# Patient Record
Sex: FEMALE | NOT HISPANIC OR LATINO | URBAN - METROPOLITAN AREA
[De-identification: names, ages, dates, MRNs, and addresses within clinical notes are randomized per-mention and may not be internally consistent; named-entity substitution may affect disease eponyms.]

---

## 2017-01-03 ENCOUNTER — AMBULATORY - HEALTHEAST (OUTPATIENT)
Dept: PULMONOLOGY | Facility: OTHER | Age: 62
End: 2017-01-03

## 2017-01-03 DIAGNOSIS — J44.9 COPD (CHRONIC OBSTRUCTIVE PULMONARY DISEASE) (H): ICD-10-CM

## 2017-03-03 ENCOUNTER — RECORDS - HEALTHEAST (OUTPATIENT)
Dept: ADMINISTRATIVE | Facility: OTHER | Age: 62
End: 2017-03-03

## 2017-03-05 ENCOUNTER — COMMUNICATION - HEALTHEAST (OUTPATIENT)
Dept: SCHEDULING | Facility: CLINIC | Age: 62
End: 2017-03-05

## 2017-03-07 ENCOUNTER — HOME CARE/HOSPICE - HEALTHEAST (OUTPATIENT)
Dept: HOME HEALTH SERVICES | Facility: HOME HEALTH | Age: 62
End: 2017-03-07

## 2017-03-13 ENCOUNTER — HOME CARE/HOSPICE - HEALTHEAST (OUTPATIENT)
Dept: HOME HEALTH SERVICES | Facility: HOME HEALTH | Age: 62
End: 2017-03-13

## 2017-03-22 ENCOUNTER — HOME CARE/HOSPICE - HEALTHEAST (OUTPATIENT)
Dept: HOME HEALTH SERVICES | Facility: HOME HEALTH | Age: 62
End: 2017-03-22

## 2017-03-31 ENCOUNTER — HOME CARE/HOSPICE - HEALTHEAST (OUTPATIENT)
Dept: HOME HEALTH SERVICES | Facility: HOME HEALTH | Age: 62
End: 2017-03-31

## 2017-04-02 ENCOUNTER — HOME CARE/HOSPICE - HEALTHEAST (OUTPATIENT)
Dept: HOME HEALTH SERVICES | Facility: HOME HEALTH | Age: 62
End: 2017-04-02

## 2017-04-04 ENCOUNTER — OFFICE VISIT - HEALTHEAST (OUTPATIENT)
Dept: PULMONOLOGY | Facility: OTHER | Age: 62
End: 2017-04-04

## 2017-04-04 DIAGNOSIS — J96.11 CHRONIC RESPIRATORY FAILURE WITH HYPOXIA (H): ICD-10-CM

## 2017-04-06 ENCOUNTER — AMBULATORY - HEALTHEAST (OUTPATIENT)
Dept: PULMONOLOGY | Facility: OTHER | Age: 62
End: 2017-04-06

## 2017-04-07 ENCOUNTER — HOME CARE/HOSPICE - HEALTHEAST (OUTPATIENT)
Dept: HOME HEALTH SERVICES | Facility: HOME HEALTH | Age: 62
End: 2017-04-07

## 2017-04-08 ENCOUNTER — HOME CARE/HOSPICE - HEALTHEAST (OUTPATIENT)
Dept: HOME HEALTH SERVICES | Facility: HOME HEALTH | Age: 62
End: 2017-04-08

## 2017-04-14 ENCOUNTER — HOME CARE/HOSPICE - HEALTHEAST (OUTPATIENT)
Dept: HOME HEALTH SERVICES | Facility: HOME HEALTH | Age: 62
End: 2017-04-14

## 2017-04-21 ENCOUNTER — HOME CARE/HOSPICE - HEALTHEAST (OUTPATIENT)
Dept: HOME HEALTH SERVICES | Facility: HOME HEALTH | Age: 62
End: 2017-04-21

## 2017-04-28 ENCOUNTER — HOME CARE/HOSPICE - HEALTHEAST (OUTPATIENT)
Dept: HOME HEALTH SERVICES | Facility: HOME HEALTH | Age: 62
End: 2017-04-28

## 2017-05-04 ENCOUNTER — HOME CARE/HOSPICE - HEALTHEAST (OUTPATIENT)
Dept: HOME HEALTH SERVICES | Facility: HOME HEALTH | Age: 62
End: 2017-05-04

## 2017-05-05 ENCOUNTER — HOME CARE/HOSPICE - HEALTHEAST (OUTPATIENT)
Dept: HOME HEALTH SERVICES | Facility: HOME HEALTH | Age: 62
End: 2017-05-05

## 2017-05-08 ENCOUNTER — OFFICE VISIT - HEALTHEAST (OUTPATIENT)
Dept: PULMONOLOGY | Facility: OTHER | Age: 62
End: 2017-05-08

## 2017-05-08 DIAGNOSIS — J44.9 COPD (CHRONIC OBSTRUCTIVE PULMONARY DISEASE) (H): ICD-10-CM

## 2017-05-12 ENCOUNTER — HOME CARE/HOSPICE - HEALTHEAST (OUTPATIENT)
Dept: HOME HEALTH SERVICES | Facility: HOME HEALTH | Age: 62
End: 2017-05-12

## 2017-06-13 ENCOUNTER — AMBULATORY - HEALTHEAST (OUTPATIENT)
Dept: PULMONOLOGY | Facility: OTHER | Age: 62
End: 2017-06-13

## 2017-06-13 DIAGNOSIS — J44.9 COPD (CHRONIC OBSTRUCTIVE PULMONARY DISEASE) (H): ICD-10-CM

## 2017-07-27 ENCOUNTER — AMBULATORY - HEALTHEAST (OUTPATIENT)
Dept: PULMONOLOGY | Facility: OTHER | Age: 62
End: 2017-07-27

## 2017-07-27 DIAGNOSIS — J44.9 COPD (CHRONIC OBSTRUCTIVE PULMONARY DISEASE) (H): ICD-10-CM

## 2017-08-08 ENCOUNTER — OFFICE VISIT - HEALTHEAST (OUTPATIENT)
Dept: PULMONOLOGY | Facility: OTHER | Age: 62
End: 2017-08-08

## 2017-08-08 DIAGNOSIS — J44.9 COPD (CHRONIC OBSTRUCTIVE PULMONARY DISEASE) (H): ICD-10-CM

## 2017-09-19 ENCOUNTER — COMMUNICATION - HEALTHEAST (OUTPATIENT)
Dept: PULMONOLOGY | Facility: OTHER | Age: 62
End: 2017-09-19

## 2017-09-19 DIAGNOSIS — J44.9 COPD (CHRONIC OBSTRUCTIVE PULMONARY DISEASE) (H): ICD-10-CM

## 2017-11-08 ENCOUNTER — AMBULATORY - HEALTHEAST (OUTPATIENT)
Dept: PULMONOLOGY | Facility: OTHER | Age: 62
End: 2017-11-08

## 2017-11-08 DIAGNOSIS — J44.9 COPD (CHRONIC OBSTRUCTIVE PULMONARY DISEASE) (H): ICD-10-CM

## 2018-01-01 ENCOUNTER — COMMUNICATION - HEALTHEAST (OUTPATIENT)
Dept: RESPIRATORY THERAPY | Facility: CLINIC | Age: 63
End: 2018-01-01

## 2018-01-01 ENCOUNTER — AMBULATORY - HEALTHEAST (OUTPATIENT)
Dept: PULMONOLOGY | Facility: OTHER | Age: 63
End: 2018-01-01

## 2018-01-01 ENCOUNTER — COMMUNICATION - HEALTHEAST (OUTPATIENT)
Dept: PHARMACY | Facility: CLINIC | Age: 63
End: 2018-01-01

## 2018-01-01 ENCOUNTER — COMMUNICATION - HEALTHEAST (OUTPATIENT)
Dept: PULMONOLOGY | Facility: OTHER | Age: 63
End: 2018-01-01

## 2018-01-01 ENCOUNTER — RECORDS - HEALTHEAST (OUTPATIENT)
Dept: ADMINISTRATIVE | Facility: OTHER | Age: 63
End: 2018-01-01

## 2018-01-01 ENCOUNTER — OFFICE VISIT - HEALTHEAST (OUTPATIENT)
Dept: PULMONOLOGY | Facility: OTHER | Age: 63
End: 2018-01-01

## 2018-01-01 ENCOUNTER — COMMUNICATION - HEALTHEAST (OUTPATIENT)
Dept: SCHEDULING | Facility: CLINIC | Age: 63
End: 2018-01-01

## 2018-01-01 ENCOUNTER — RECORDS - HEALTHEAST (OUTPATIENT)
Dept: LAB | Facility: CLINIC | Age: 63
End: 2018-01-01

## 2018-01-01 DIAGNOSIS — J44.1 COPD WITH EXACERBATION (H): ICD-10-CM

## 2018-01-01 DIAGNOSIS — J44.9 COPD (CHRONIC OBSTRUCTIVE PULMONARY DISEASE) (H): ICD-10-CM

## 2018-01-01 DIAGNOSIS — J44.9 CHRONIC OBSTRUCTIVE PULMONARY DISEASE, UNSPECIFIED COPD TYPE (H): ICD-10-CM

## 2018-01-01 DIAGNOSIS — J43.2 CENTRILOBULAR EMPHYSEMA (H): ICD-10-CM

## 2018-01-01 DIAGNOSIS — Z51.5 ENCOUNTER FOR PALLIATIVE CARE: ICD-10-CM

## 2018-01-01 DIAGNOSIS — R06.02 SOB (SHORTNESS OF BREATH): ICD-10-CM

## 2018-01-01 DIAGNOSIS — Z71.89 ADVANCE CARE PLANNING: ICD-10-CM

## 2018-01-01 DIAGNOSIS — J44.1 COPD EXACERBATION (H): ICD-10-CM

## 2018-01-01 LAB — BACTERIA SPEC CULT: NO GROWTH

## 2018-01-01 RX ORDER — BUDESONIDE AND FORMOTEROL FUMARATE DIHYDRATE 160; 4.5 UG/1; UG/1
1 AEROSOL RESPIRATORY (INHALATION) 2 TIMES DAILY
Qty: 1 INHALER | Refills: 6 | Status: SHIPPED | OUTPATIENT
Start: 2018-01-01 | End: 2019-01-01

## 2018-01-01 ASSESSMENT — MIFFLIN-ST. JEOR: SCORE: 1028.96

## 2018-01-09 ENCOUNTER — RECORDS - HEALTHEAST (OUTPATIENT)
Dept: LAB | Facility: CLINIC | Age: 63
End: 2018-01-09

## 2018-01-09 LAB
ALBUMIN SERPL-MCNC: 3.5 G/DL (ref 3.5–5)
ALP SERPL-CCNC: 57 U/L (ref 45–120)
ALT SERPL W P-5'-P-CCNC: 17 U/L (ref 0–45)
ANION GAP SERPL CALCULATED.3IONS-SCNC: 13 MMOL/L (ref 5–18)
AST SERPL W P-5'-P-CCNC: 12 U/L (ref 0–40)
BILIRUB SERPL-MCNC: 0.3 MG/DL (ref 0–1)
BUN SERPL-MCNC: 5 MG/DL (ref 8–22)
CALCIUM SERPL-MCNC: 9.2 MG/DL (ref 8.5–10.5)
CHLORIDE BLD-SCNC: 100 MMOL/L (ref 98–107)
CO2 SERPL-SCNC: 27 MMOL/L (ref 22–31)
CREAT SERPL-MCNC: 0.51 MG/DL (ref 0.6–1.1)
GFR SERPL CREATININE-BSD FRML MDRD: >60 ML/MIN/1.73M2
GLUCOSE BLD-MCNC: 97 MG/DL (ref 70–125)
POTASSIUM BLD-SCNC: 3.4 MMOL/L (ref 3.5–5)
PROT SERPL-MCNC: 6.7 G/DL (ref 6–8)
SODIUM SERPL-SCNC: 140 MMOL/L (ref 136–145)

## 2018-01-10 LAB — BACTERIA SPEC CULT: NO GROWTH

## 2018-01-17 ENCOUNTER — AMBULATORY - HEALTHEAST (OUTPATIENT)
Dept: PULMONOLOGY | Facility: OTHER | Age: 63
End: 2018-01-17

## 2018-01-17 DIAGNOSIS — J44.9 COPD (CHRONIC OBSTRUCTIVE PULMONARY DISEASE) (H): ICD-10-CM

## 2018-02-05 ENCOUNTER — RECORDS - HEALTHEAST (OUTPATIENT)
Dept: LAB | Facility: CLINIC | Age: 63
End: 2018-02-05

## 2018-02-06 LAB — BACTERIA SPEC CULT: NO GROWTH

## 2018-02-19 ENCOUNTER — AMBULATORY - HEALTHEAST (OUTPATIENT)
Dept: PULMONOLOGY | Facility: OTHER | Age: 63
End: 2018-02-19

## 2018-02-20 ENCOUNTER — AMBULATORY - HEALTHEAST (OUTPATIENT)
Dept: PULMONOLOGY | Facility: OTHER | Age: 63
End: 2018-02-20

## 2018-02-20 DIAGNOSIS — J44.9 COPD (CHRONIC OBSTRUCTIVE PULMONARY DISEASE) (H): ICD-10-CM

## 2018-02-21 ENCOUNTER — AMBULATORY - HEALTHEAST (OUTPATIENT)
Dept: PULMONOLOGY | Facility: OTHER | Age: 63
End: 2018-02-21

## 2018-02-21 ENCOUNTER — OFFICE VISIT - HEALTHEAST (OUTPATIENT)
Dept: PULMONOLOGY | Facility: OTHER | Age: 63
End: 2018-02-21

## 2018-02-21 DIAGNOSIS — J43.9 PULMONARY EMPHYSEMA, UNSPECIFIED EMPHYSEMA TYPE (H): ICD-10-CM

## 2019-01-01 ENCOUNTER — AMBULATORY - HEALTHEAST (OUTPATIENT)
Dept: PULMONOLOGY | Facility: OTHER | Age: 64
End: 2019-01-01

## 2019-01-01 ENCOUNTER — HOME CARE/HOSPICE - HEALTHEAST (OUTPATIENT)
Dept: HOSPICE | Facility: HOSPICE | Age: 64
End: 2019-01-01

## 2019-01-01 ENCOUNTER — COMMUNICATION - HEALTHEAST (OUTPATIENT)
Dept: RESPIRATORY THERAPY | Facility: CLINIC | Age: 64
End: 2019-01-01

## 2019-01-01 ENCOUNTER — OFFICE VISIT - HEALTHEAST (OUTPATIENT)
Dept: PULMONOLOGY | Facility: OTHER | Age: 64
End: 2019-01-01

## 2019-01-01 DIAGNOSIS — J44.9 COPD (CHRONIC OBSTRUCTIVE PULMONARY DISEASE) (H): ICD-10-CM

## 2019-01-01 DIAGNOSIS — J44.9 COPD, GROUP D, BY GOLD 2017 CLASSIFICATION (H): ICD-10-CM

## 2019-01-01 RX ORDER — ALBUTEROL SULFATE 90 UG/1
AEROSOL, METERED RESPIRATORY (INHALATION)
Refills: 6 | Status: SHIPPED | COMMUNITY
Start: 2019-01-01

## 2019-01-01 RX ORDER — AZITHROMYCIN 250 MG/1
250 TABLET, FILM COATED ORAL DAILY
Status: SHIPPED | COMMUNITY
Start: 2019-01-01

## 2019-04-12 ENCOUNTER — HOME CARE/HOSPICE - HEALTHEAST (OUTPATIENT)
Dept: HOSPICE | Facility: HOSPICE | Age: 64
End: 2019-04-12

## 2019-11-22 ENCOUNTER — RECORDS - HEALTHEAST (OUTPATIENT)
Dept: ADMINISTRATIVE | Facility: OTHER | Age: 64
End: 2019-11-22

## 2019-12-03 ENCOUNTER — RECORDS - HEALTHEAST (OUTPATIENT)
Dept: ADMINISTRATIVE | Facility: OTHER | Age: 64
End: 2019-12-03

## 2019-12-10 ENCOUNTER — RECORDS - HEALTHEAST (OUTPATIENT)
Dept: ADMINISTRATIVE | Facility: OTHER | Age: 64
End: 2019-12-10

## 2021-05-30 VITALS — WEIGHT: 136.9 LBS | BODY MASS INDEX: 25.87 KG/M2

## 2021-05-30 VITALS — BODY MASS INDEX: 25.58 KG/M2 | WEIGHT: 135.4 LBS

## 2021-05-31 VITALS — WEIGHT: 133.4 LBS | BODY MASS INDEX: 25.21 KG/M2

## 2021-06-01 VITALS — BODY MASS INDEX: 21.11 KG/M2 | WEIGHT: 123 LBS

## 2021-06-01 VITALS — WEIGHT: 129 LBS | BODY MASS INDEX: 22.14 KG/M2

## 2021-06-01 VITALS — BODY MASS INDEX: 26.52 KG/M2 | WEIGHT: 131.3 LBS

## 2021-06-02 VITALS — WEIGHT: 131 LBS | HEIGHT: 58 IN | BODY MASS INDEX: 27.5 KG/M2

## 2021-06-02 VITALS — WEIGHT: 130.7 LBS | BODY MASS INDEX: 27.32 KG/M2

## 2021-06-02 VITALS — BODY MASS INDEX: 27.32 KG/M2 | WEIGHT: 130.7 LBS

## 2021-06-09 NOTE — PROGRESS NOTES
After ambulating 75ft on 1LPM oxygen saturation is 87%.    After ambulating 150ft on 2LPM oxygen saturation is 89-90%.    Pt already has oxygen and wants a portable tank.  Please fax order to [phone].    Patient is ambulatory within his/her home.

## 2021-06-09 NOTE — PROGRESS NOTES
Order for portable oxygen concentrator sent to [facility].  Called German with [facility] and he told me that the patient has a past due balance and until that balance is paid they will not be able to get a portable oxygen concentrator approved.  He stated that he will be calling the patient today to explain this to her.

## 2021-06-10 NOTE — PROGRESS NOTES
CCx: COPD follow up    HPI: Ms. Blackburn is a 61 year old female with severe copd and an oxygen requirement.  She was recently prescribed a long prednisone taper for an exacerbation.  She presents off of prednisone for a checkup.  She feels her lungs are back to normal.  She gets short of breath with exertion only, and continues to have good and bad days.  She isn't really limited in doing the things she wants to do.  She does drag her oxygen tank up three flights of steps to her apartment and says she has to pay the bills on her current tanks before she will get a portable oxygen concentrator.    She uses her inhaled meds as described, and seems to finally understand how to take them and how often.    ROS:  Review of Systems - History obtained from the patient  General ROS: negative  Psychological ROS: negative  ENT ROS: negative  Allergy and Immunology ROS: negative  Endocrine ROS: negative  Respiratory ROS: positive for - dyspnea and occasional cough  negative for - tachypnea or wheezing  Cardiovascular ROS: no chest pain or palpitations  Gastrointestinal ROS: no abdominal pain, change in bowel habits, or black or bloody stools  Genito-Urinary ROS: no dysuria, trouble voiding, or hematuria  Musculoskeletal ROS: negative  Neurological ROS: no TIA or stroke symptoms  Dermatological ROS: negative      Current Meds:  Current Outpatient Prescriptions   Medication Sig     albuterol (PROVENTIL HFA;VENTOLIN HFA) 90 mcg/actuation inhaler Inhale 2 puffs every 4 (four) hours as needed for wheezing.     albuterol (PROVENTIL) 2.5 mg /3 mL (0.083 %) nebulizer solution Take 3 mL (2.5 mg total) by nebulization every 6 (six) hours as needed for wheezing.     bisacodyl (DULCOLAX) 5 mg EC tablet Take 5 mg by mouth daily as needed for constipation. Indications: Constipation     budesonide-formoterol (SYMBICORT) 160-4.5 mcg/actuation inhaler Inhale 1 puff 2 (two) times a day.     nebulizer and compressor Guadalupe Use with albuterol neb  solution up to every 4 hours.     OXYGEN-AIR DELIVERY SYSTEMS MISC Inhale 3-4 L/hr continuous.      polyethylene glycol (GLYCOLAX) 17 gram/dose powder Take 17 g by mouth daily. Indications: Constipation     tiotropium bromide (SPIRIVA RESPIMAT) 2.5 mcg/actuation Mist Inhale 1 puff daily.       Labs:  No results found for this or any previous visit (from the past 72 hour(s)).    I have personally reviewed all pertinent imaging studies and PFT results unless otherwise noted.    Imaging studies:  Xr Chest Pa And Lateral    Result Date: 3/5/2017  XR CHEST PA AND LATERAL 3/5/2017 10:05 AM INDICATION: Persistent wheezing. COMPARISON: 2/23/2017 FINDINGS: Heart size and pulmonary vascularity normal. Linear strand of fibrosis or atelectasis mid left lung. The lungs otherwise are clear. Scoliosis.        Physical Exam:  /66  Pulse 68  Resp 24  Wt 135 lb 6.4 oz (61.4 kg)  SpO2 90% Comment: RA  BMI 25.58 kg/m2  General - Well nourished  Ears/Mouth - TMs clear bilaterally,  OP pink moist, no thrush  Neck - no cervical lymphadenopathy  Lungs - faint throughout with rhonchi in bases  CVS - regular rhythm with no murmurs, rubs or gallups  Abdomen - soft, NT, ND, NABS  Ext - no cyanosis, clubbing or edema  Skin - no rash  Psychology - alert and oriented, answers appropriate      Assessment and Plan:Soila Blackburn is a 61 y.o. with a past medical history significant for very severe COPD with chronic hypoxemic respiratory failure who presents to clinic today for a short follow up a receiving a prednisone taper.  She seems to have responded well and appears back to her baseline.  She likes the way she felt on prednisone, but shouldn't stay on this chronically.  She is dragging an oxygen tank up and down steps to get to her apartment, I would hope she would soon qualify for a portable concentrator.    1) COPD - severe, continue current prescriptions with no changes, no more prednisone for now.  2) Hypoxic respiratory failure  - only needs ambulatory oxygen of 1-2 liters.    3) RTC in 3 months        Electronically signed by:    Shane Cobos MD [facility] Pulmonary and Critical Care Medicine

## 2021-06-12 NOTE — PROGRESS NOTES
CCx: COPD follow up    HPI: Ms. Blackburn is a 62 year old female with severe COPD, and hypoxemic respiratory failure.  She returns for follow up.  She feels she has good and bad days, and overall is no different than her last visit.  She coughs up mucous everyday, sometimes she feels like it is too much.  She is using spiriva and symbicort as instructed.  She is using albuterol twice a day, but maybe not when she needs it.  She wears oxygen most of the time, she says at 4L intially, but then says 2.  She is having headaches, and admits she gets confused easily.  She doesn't feel she is sick enough to warrant prednisone.    ROS:  Review of Systems - History obtained from the patient  General ROS: low energy  Psychological ROS: negative  ENT ROS: negative  Allergy and Immunology ROS: negative  Endocrine ROS: negative  Respiratory ROS: positive for - cough, shortness of breath and wheezing  negative for - hemoptysis, orthopnea or pleuritic pain  Cardiovascular ROS: no chest pain or palpitations  Gastrointestinal ROS: no abdominal pain, change in bowel habits, or black or bloody stools  Genito-Urinary ROS: no dysuria, trouble voiding, or hematuria  Musculoskeletal ROS: negative  Neurological ROS: no TIA or stroke symptoms  Dermatological ROS: negative      Current Meds:  Current Outpatient Prescriptions   Medication Sig     albuterol (PROAIR HFA;PROVENTIL HFA;VENTOLIN HFA) 90 mcg/actuation inhaler Inhale 2 puffs every 4 (four) hours as needed for wheezing.     bisacodyl (DULCOLAX) 5 mg EC tablet Take 5 mg by mouth daily as needed for constipation. Indications: Constipation     budesonide-formoterol (SYMBICORT) 160-4.5 mcg/actuation inhaler Inhale 1 puff 2 (two) times a day.     OXYGEN-AIR DELIVERY SYSTEMS MISC Inhale 3-4 L/hr continuous.      polyethylene glycol (GLYCOLAX) 17 gram/dose powder Take 17 g by mouth daily. Indications: Constipation     tiotropium bromide (SPIRIVA RESPIMAT) 2.5 mcg/actuation Mist Inhale 1 puff  daily.       Labs:  No results found for this or any previous visit (from the past 72 hour(s)).    I have personally reviewed all pertinent imaging studies and PFT results unless otherwise noted.    Imaging studies:  Xr Chest Pa And Lateral    Result Date: 3/5/2017  XR CHEST PA AND LATERAL 3/5/2017 10:05 AM INDICATION: Persistent wheezing. COMPARISON: 2/23/2017 FINDINGS: Heart size and pulmonary vascularity normal. Linear strand of fibrosis or atelectasis mid left lung. The lungs otherwise are clear. Scoliosis.        Physical Exam:  /58  Pulse 71  Resp 18  Wt 133 lb 6.4 oz (60.5 kg)  SpO2 (!) 89% Comment: RA-pt uses 4L of O2  BMI 25.21 kg/m2  General - Well nourished  Ears/Mouth - TMs clear bilaterally,  OP pink moist, no thrush  Neck - no cervical lymphadenopathy  Lungs - Diminished with diffuse low pitched wheezes - kyphosis  CVS - regular rhythm with no murmurs, rubs or gallups  Abdomen - soft, NT, ND, NABS  Ext - no cyanosis, clubbing or edema  Skin - no rash  Psychology - alert and oriented, answers appropriate      Assessment and Plan:Soila Blackburn is a 62 y.o. with a past medical history significant for severe COPD with chronic hypoxemic respiratory failure who presents to clinic today for follow up.  She is about the same as she was last time.  She does accurately describe her medicines, but still gives me the impression she is not adherent with them.  She appears to be using her oxygen correctly as well, although I suspect not as diligently as she should as she wasn't wearing it when she showed up today.   1) COPD - continue spiriva and symbicort.  Should use her prn albuterol neb up to every four hours.  She should use oxygen 1-2L at rest, up to four with exertion.  Very low threshold to call in Rx for prednisone if she needs it.  2) Confusion - suspect some hypercarbia, but she is a poor candidate for BIPAP support at home given her inability to handle complicated medical regimens, it is also  unclear if this would add appreciably to the quality or quantity of her life  3) RTC in 3 months        Electronically signed by:    Shane Cobos MD PhD  [facility] Pulmonary and Critical Care Medicine

## 2021-06-16 PROBLEM — Z66 DO NOT RESUSCITATE: Chronic | Status: ACTIVE | Noted: 2019-01-01

## 2021-06-16 PROBLEM — Z51.5 COMFORT MEASURES ONLY STATUS: Chronic | Status: ACTIVE | Noted: 2019-01-01

## 2021-06-16 PROBLEM — J44.1 COPD WITH EXACERBATION (H): Chronic | Status: ACTIVE | Noted: 2018-01-01

## 2021-06-16 PROBLEM — J18.9 ACUTE PNEUMONIA: Chronic | Status: ACTIVE | Noted: 2019-01-01

## 2021-06-16 NOTE — PROGRESS NOTES
This order was actually faxed to [facility].  Olivia called to check on the status of this, per pt's request, and [facility] has the paperwork and they are working on it.

## 2021-06-16 NOTE — PROGRESS NOTES
Patient oxygen saturation on RA at rest is 91%.  Oxygen saturation after ambulating 100ft on RA is 87%.    After ambulating 300ft on 2LPM oxygen saturation is 89-90%.    DME Provider: Ashley    Patient is ambulatory within his/her home.

## 2021-06-16 NOTE — PROGRESS NOTES
We received a letter from pt's insurance stating that the Spiriva is no longer going to be covered.  The formulary alternative is Incruse Ellipta.  I sent a message to Dr. Cobos to see if he was ok with switching pt to the Incruse Ellipta.  He was ok with switching.  I will ask Randee to send over new Rx to pt's pharmacy.  I called and informed pt of this change and that we would be sending the Rx to her pharmacy.  I asked pt. to let us know if they have any side effects or if it is not working.  She said that she does have a new inhaler that she has not even opened up yet, so she will use that first before switching over to the Incruse.  She verbalizes understanding and agrees to this plan.
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
## 2021-06-17 NOTE — PROGRESS NOTES
Assessment and Plan:Soila Blackburn is a 62 y.o. with a past medical history significant for very severe COPD with ambulatory chronic hypoxia who presents to clinic today for follow up.  She has a viral URI and a COPD exacerbation today.  She has respiratory distress and significant wheezing.    1) COPD exacerbation - prednisone 40 mg for 3 days, 20 mg for 3 days.  She is not one to understand complex medical regimens so will try to keep this taper simpler and short.  If she is not better at the end of this, she should call.   2) COPD maintenance - continue all current medications including symbicort BID, incruse once a day, and as needed albuterol nebs or MDI.  3) RTC in 3 months     Due to desaturation of SaO2 less than or equal to 88% on room air at rest from COPD, home oxygen therapy will benefit my patient's condition.  The patient has tried (or considered) other medications with limited success and oxygen is still required. The patient is mobile in the home and requires portability.      CCx:COPD exacerbation    HPI: Ms. Blackburn is a 62 year old female with very severe, Gold D, COPD.  She says she caught a cold recently and has been coughing and short of breath.  She is using her albuterol nebulizer 4 times a day plus her maintenance meds.  She has no fevers or chills, and has no chest pains.  She is not ambulating much as it makes her too winded.  She is hoping to get a portable oxygen concentrator.  She has a new home concentrator which she likes, but she doesn't want to take tanks around with her.      ROS:  Review of Systems - History obtained from the patient  General ROS: negative  Psychological ROS: negative  ENT ROS: negative  Allergy and Immunology ROS: stuffy nose  Endocrine ROS: negative  Respiratory ROS: positive for - cough, shortness of breath, sputum changes, tachypnea and wheezing  negative for - orthopnea or pleuritic pain  Cardiovascular ROS: no chest pain or palpitations  Gastrointestinal ROS:  no abdominal pain, change in bowel habits, or black or bloody stools  Genito-Urinary ROS: no dysuria, trouble voiding, or hematuria  Musculoskeletal ROS: negative  Neurological ROS: no TIA or stroke symptoms  Dermatological ROS: negative      Current Meds:  Current Outpatient Prescriptions   Medication Sig Note     albuterol (PROAIR HFA;PROVENTIL HFA;VENTOLIN HFA) 90 mcg/actuation inhaler Inhale 2 puffs every 4 (four) hours as needed for wheezing.      albuterol (PROVENTIL) 2.5 mg /3 mL (0.083 %) nebulizer solution Take 2.5 mg by nebulization every 6 (six) hours as needed for wheezing. 5/1/2018: Received from: External Pharmacy Received Sig: TAKE 3 ML (2.5 MG TOTAL) BY NEBULIZATION EVERY 6 (SIX) HOURS AS NEEDED FOR WHEEZING.     diphenhydrAMINE (BENADRYL) 25 mg capsule Take 25 mg by mouth every 6 (six) hours as needed for itching.      ondansetron (ZOFRAN) 4 MG tablet TAKE 1 TO 2 TABLETS BY MOUTH EVERY 8 HOURS AS NEEDED FOR NAUSEA 2/21/2018: Received from: External Pharmacy     polyethylene glycol (GLYCOLAX) 17 gram/dose powder Take 17 g by mouth daily. Indications: Constipation      umeclidinium (INCRUSE ELLIPTA) 62.5 mcg/actuation DsDv inhaler Inhale 1 puff daily.      budesonide-formoterol (SYMBICORT) 160-4.5 mcg/actuation inhaler Inhale 1 puff 2 (two) times a day.        Labs:  No results found for this or any previous visit (from the past 72 hour(s)).    I have personally reviewed all pertinent imaging studies and PFT results unless otherwise noted.    Imaging studies:  Xr Chest 2 Views    Result Date: 1/23/2018  XR CHEST 2 VIEWS 1/23/2018 11:55 AM INDICATION: dyspnea, hypoxia, copd COMPARISON: None. FINDINGS: Linear opacity in the left lung laterally likely represents atelectasis or fibrosis. The lungs are otherwise clear. The pulmonary vasculature, cardiac silhouette, and pleural spaces appear normal.        Physical Exam:  /70  Pulse 68  Resp 24  Wt 129 lb (58.5 kg)  SpO2 90% Comment: RA  BMI  22.14 kg/m2  General - Well nourished in mild respiratory distress  Ears/Mouth -  OP pink moist, no thrush  Neck - no cervical lymphadenopathy  Lungs - coarse with wheezing  CVS - regular rhythm with no murmurs, rubs or gallups  Abdomen - soft, NT, ND, NABS  Ext - no cyanosis, clubbing or edema  Skin - no rash  Psychology - alert and oriented, answers appropriate        Electronically signed by:    Shane Cobos MD PhD  [facility] Pulmonary and Critical Care Medicine

## 2021-06-17 NOTE — PROGRESS NOTES
Dr. Cobos put an order in for a POC (per pt's request) but she gets her oxygen from [employer].  [employer] will not give pt's POC's.   I called pt to inform her of this info and that if she would like a POC, she would have to switch to another DME.  Pt states that she would like to check with [employer] to see if she has an outstanding balance with them first.  She said that after she calls them, then she will let us know if she would like to switch to another DME company.

## 2021-06-18 NOTE — LETTER
Letter by Marck Aldrich MD at      Author: Marck Aldrich MD Service: -- Author Type: --    Filed:  Encounter Date: 2/15/2019 Status: (Other)       Vern Pascal MD  [address] [place] [address]                                  February 15, 2019    Patient: Soila Blackburn   MR Number: [id number]   YOB: 1955   Date of Visit: 2/15/2019     Dear Dr. Gwyn MD:    Thank you for referring Soila Blackburn to me for evaluation. Below are the relevant portions of my assessment and plan of care.    If you have questions, please do not hesitate to call me. I look forward to following Soila along with you.    Sincerely,        Marck Aldrich MD          CC  MD Elinor Alvarez Avraham, MD  2/15/2019  9:22 AM  Sign at close encounter  Assessment and Plan:Soila Blackburn is a 63 y.o. with a past medical history significant for very severe COPD, with chronic hypoxic, hypercarbic respiratory failure who presents to clinic today for face to face evaluation for a new nebulizer machine. Her COPD appears to be stable. Discussed importance of using oxygen at all times to keep O2 sat 88-92%.    Recommendations:  - new DME Rx for neb machine written.  - continue oxygen for goal O2 sat 88-92%. Avoid hyperoxia.  - continue inhalers, no changes today.  - UTD with flu  - will give PSV23 today. Should have PCV13 at age 65.  - continue f/u with palliative care.    Follow up with Dr. Cobos in 3 months.      CCx: urgent visit for new neb machine.    HPI: Interim history: She was last seen by Dr. Cobos on 10/2/2018. She returns today for face to face evaluation for a nebulizer machine. Her machine  a few weeks ago and she hasn't been able to use her nebs. She feels her breathing is slightly worse as a result and she gets intermittent right-sided chest discomfort that comes and goes. No chest pressure, diaphoresis, arm or jaw pain. Her breathing seems to be stable. Using oxygen at night mostly.  Using  inhalers.  No recent exacerbations.  Saw palliative care last October. She was offered morphine prn but declined.    ROS:  Review of Systems - History obtained from the patient  General ROS: negative  Psychological ROS: negative  ENT ROS: negative  Allergy and Immunology ROS: negative  Endocrine ROS: negative  Respiratory ROS: positive for - cough, shortness of breath, tachypnea and wheezing  negative for - orthopnea or pleuritic pain  Cardiovascular ROS: no chest pain or palpitations  Gastrointestinal ROS: no abdominal pain, change in bowel habits, or black or bloody stools  Genito-Urinary ROS: no dysuria, trouble voiding, or hematuria  Musculoskeletal ROS: negative  Neurological ROS: no TIA or stroke symptoms  Dermatological ROS: negative      Current Meds:  Current Outpatient Medications   Medication Sig   ? albuterol (PROVENTIL) 2.5 mg /3 mL (0.083 %) nebulizer solution Take 3 mL (2.5 mg total) by nebulization every 6 (six) hours as needed for wheezing.   ? azithromycin (ZITHROMAX) 250 MG tablet Take 250 mg by mouth daily. Take 500 mg (2 x 250 mg tablets) on day 1 followed by 250 mg (1 tablet) on days 2-5.   ? bisacodyl (DULCOLAX, BISACODYL,) 5 mg EC tablet Take 5 mg by mouth daily as needed for constipation (patient takes 5 mg by mouth twice weekly on average).   ? umeclidinium (INCRUSE ELLIPTA) 62.5 mcg/actuation DsDv inhaler Inhale 1 puff daily.   ? VENTOLIN HFA 90 mcg/actuation inhaler TAKE 2 PUFFS BY MOUTH EVERY 4 HOURS AS NEEDED FOR WHEEZE   ? budesonide-formoterol (SYMBICORT) 160-4.5 mcg/actuation inhaler Inhale 1 puff 2 (two) times a day.       Labs:  No results found for this or any previous visit (from the past 72 hour(s)).    PFTs, 6MWT:  PULMONARY FUNCTION TEST  FEV1/FVC is 43% and is reduced.  FEV1 is 0.57 L (25%) predicted and is reduced.  FVC is 1.34 L (47%) predicted and reduced.  There was no improvement in spirometry after a single inhaled dose of bronchodilator.  TLC is 6.17 L (134%) predicted  and is increased.  RV is 4.71 L (255%) predicted and is increased.  DLCO is 31% predicted and is reduced when it is corrected for hemoglobin.     Impression:    Full Pulmonary Function Test is abnormal.  PFTs are consistent with very severe obstructive disease.  Spirometry is not consistent with reversibility.  There is hyperinflation.  There is air-trapping.  Diffusion capacity when corrected for hemoglobin is severely reduced.     SIX MINUTE WALK TEST / HOME O2 EVALUATION     SpO2 at rest on RA 87%  SpO2 after walking 6 minutes on RA  83%  Distance covered 1000 feet.  Recovery phase, SpO2 after 1 minute rest on RA was 84%  Recovery phase, SpO2 after 2 minutes rest on RA was 87%     Impression:   Hypoxia at rest , significant desaturation with activities.   Start O2 supplementation 2 LPM continuously, repeat six minute walk test to titrate FiO2.     Imaging studies:  Xr Chest 2 Views    Result Date: 9/8/2018  XR CHEST 2 VIEWS 9/8/2018 1:15 AM INDICATION: Sob COMPARISON: 5/19/2018 FINDINGS: Scoliosis. Normal heart size. Lungs clear.        Physical Exam:  /70   Pulse 68   Resp 20   Wt 130 lb 11.2 oz (59.3 kg)   SpO2 (!) 89% Comment: RA  BMI 27.32 kg/m     General - Well nourished  Ears/Mouth -  OP pink moist, no thrush  Neck - no cervical lymphadenopathy  Lungs - distant BS fair air mvmt no wheezing.  CVS - regular rhythm with no murmurs, rubs or gallups  Abdomen - soft, NT, ND, NABS  Ext - no cyanosis, clubbing or edema  Skin - no rash  Psychology - alert and oriented, answers appropriate        Electronically signed by:  Marck Aldrich MD (Avi)  [facility] Pulmonary & Critical Care  Pager [phone]  Clinic [phone]

## 2021-06-20 NOTE — PROGRESS NOTES
Assessment and Plan:Soila Blackburn is a 63 y.o. with a past medical history significant for severe COPD and chronic hypoxic and hypercarbic respiratory failure who presents to clinic today for follow up.  Soila seems to have finally found a regimen she can remember to take routinely.  It may have offered her more than usual stability BUT she still requires frequent albuterol through day and remains high risk with >2 exacerbations per year.  She has a relatively high mortality risk from her COPD and I have asked her to consider seeing our palliative care team and she says she will think on it.    1) Severe COPD - continue incruse, symbicort and as needed albuterol.  Encourage exercise and ambulation as possible.  Pulmonary rehab may help, but she is not terribly mobile or motivated.  2) Chronic hypoxic/hypercarbic respiratory failure - challenged to use her oxygen appropriately, possibly doing better now with 4L chronically.  Unclear if she is actually using this 24/7.  Would be a poor candiate for BIPAP at night for hypercarbia.  Chronic headaches suggest hypercarbia at play.    3) Encourage palliative care visit - will discuss on next visit as well.  4) Not doing Lung cancer screening as she is not a surgical candidate  5) RTC in 3 months.      CCx:COPD follow up    HPI: Ms. Blackburn returns for follow up for her COPD.  When asked she says she is fine with no new issues.  She was in the ER recently for dyspnea and recently completed a prednisone course which she says worked fine.  She initially says she has no problems, but admits she uses her albuterol inhaler 3-4 times a day and her albuterol nebulizer 4-5 times a day because she cant breathe if she doesn't use them.   She says this is normal for her, and part of her routine.  She is using her chronic meds as described and is able to accurately describe them and how to use them.  She coughs and wheezes intermittently, and gets short of breath with fairly minimal  activity.  She wakes up with headaches most days.  When asked about considering a visit with our palliative care team, she says she wants to think about it.       ROS:  Review of Systems - History obtained from the patient  General ROS: negative - headaches  Psychological ROS: negative  ENT ROS: negative  Allergy and Immunology ROS: negative  Endocrine ROS: negative  Respiratory ROS: positive for - cough, shortness of breath, sputum changes and wheezing  negative for - stridor  Cardiovascular ROS: no chest pain or palpitations  Gastrointestinal ROS: no abdominal pain, change in bowel habits, or black or bloody stools  Genito-Urinary ROS: no dysuria, trouble voiding, or hematuria  Musculoskeletal ROS: negative  Neurological ROS: no TIA or stroke symptoms  Dermatological ROS: negative      Current Meds:  Current Outpatient Prescriptions   Medication Sig     albuterol (PROAIR HFA;PROVENTIL HFA;VENTOLIN HFA) 90 mcg/actuation inhaler Inhale 2 puffs every 4 (four) hours as needed for wheezing. (Patient taking differently: Inhale 2 puffs every 4 (four) hours as needed for wheezing (patient reports inhaling 2 puffs every 4 hours daily). )     albuterol (PROVENTIL) 2.5 mg /3 mL (0.083 %) nebulizer solution Take 2.5 mg by nebulization every 6 (six) hours as needed for wheezing.     bisacodyl (DULCOLAX, BISACODYL,) 5 mg EC tablet Take 5 mg by mouth daily as needed for constipation (patient takes 5 mg by mouth twice weekly on average).     diphenhydrAMINE (BENADRYL) 25 mg capsule Take 25 mg by mouth every 6 (six) hours as needed for itching.     polyethylene glycol (GLYCOLAX) 17 gram/dose powder Take 17 g by mouth daily. Indications: Constipation     umeclidinium (INCRUSE ELLIPTA) 62.5 mcg/actuation DsDv inhaler Inhale 1 puff daily.     budesonide-formoterol (SYMBICORT) 160-4.5 mcg/actuation inhaler Inhale 1 puff 2 (two) times a day.       Labs:  No results found for this or any previous visit (from the past 72 hour(s)).    I  have personally reviewed all pertinent imaging studies and PFT results unless otherwise noted.    Imaging studies:  Xr Chest 2 Views    Result Date: 5/19/2018  XR CHEST 2 VIEWS 5/19/2018 10:43 AM INDICATION: Cough COMPARISON: 05/04/2018 FINDINGS: The lungs appear hyper inflated on the lateral view, the pulmonary arteries also appear slightly prominent. No pneumonia is seen. The heart and pulmonary vasculature are normal. There is scoliosis of the thoracic spine convex to the right        Physical Exam:  /60  Pulse 60  Resp 24  Wt 131 lb 4.8 oz (59.6 kg)  SpO2 (!) 88% Comment: RA  BMI 26.52 kg/m2  General - Well nourished  Ears/Mouth -  OP pink moist, no thrush  Neck - no cervical lymphadenopathy  Lungs - scattered rhonchi and wheezes  CVS - regular rhythm with no murmurs, rubs or gallups  Abdomen - soft, NT, ND, NABS  Ext - no cyanosis, clubbing or edema  Skin - no rash  Psychology - alert and oriented, answers appropriate        Electronically signed by:    Shane Cobos MD PhD  [facility] Pulmonary and Critical Care Medicine

## 2021-06-20 NOTE — PROGRESS NOTES
Assessment and Plan:Soila Blackburn is a 63 y.o. with a past medical history significant for very severe COPD, with chronic hypoxic, hypercarbic respiratory failure who presents to clinic today for hospital follow up.  She has end stage lung disease and has had multiple exacerbations.  She has little insight into her mortality despite this being reiterated in clinic.  She does not like BIPAP therapy, and thus the treatment of her baseline hypercarbia is more limited.  She has very little reserve.  I agree she should continue to follow with palliative care as an outpatient to help clarify goals of care and symptom control.  I suspect she minimizes the severity of her disability, making it hard to know how to help.    1) Severe COPD - remain on incruse and symbicort. Start azithromycin for chronic use to help minimize exacerbations.  2) Chronic hypoxic/hypercarbic respiratory failure - continue oxygen at 4L continuous  3) End stage lung disease - refer to follow with palliative care as an outpatient  4) RTC in 3 months.       CCx: copd    HPI: Ms. Blackburn returns for follow up of a hospitalization from 09/07-09/13.  She has severe COPD with chronic hypercarbic/hypoxic respiratory failure.  She was admitted with acute on chronic respiratory acidosis and placed on BIPAP and IV solumedrol.  She made a slow recovery and was weaned to her baseline of 4L oxygen.  She met with palliative care while an inpatient and was DNR/DNI.    Today she feels back to her normal self.  She wears 4L of oxygen chronically, but it was not on when she arrived here.  She has a daily cough.  She lives on the third floor of an apartment building and can make it up three flights, slowly.  She uses daily symbicort and incruse.  I spent much time trying to explain why I was also starting azithromycin to help reduce exacerbations.  She is very worried about medical costs, and is quickly confused by my explanations.  She reasks questions I have  "answered shortly after asking.  She admits she doesn't always where her oxygen, sometimes because it gives her headaches.  She hated the BIPAP in the hospital as it made her feel \"awful\" and thought it was too much air.    When I mention she has poor lungs with a limited prognosis, she diverts to discussing her medicare coverage.  She is confused by how much she is going to pay for her drugs and services.      ROS:  Review of Systems - History obtained from the patient  General ROS: negative  Psychological ROS: negative  ENT ROS: negative  Allergy and Immunology ROS: negative  Endocrine ROS: negative  Respiratory ROS: positive for - cough, shortness of breath, tachypnea and wheezing  negative for - orthopnea or pleuritic pain  Cardiovascular ROS: no chest pain or palpitations  Gastrointestinal ROS: no abdominal pain, change in bowel habits, or black or bloody stools  Genito-Urinary ROS: no dysuria, trouble voiding, or hematuria  Musculoskeletal ROS: negative  Neurological ROS: no TIA or stroke symptoms  Dermatological ROS: negative      Current Meds:  Current Outpatient Prescriptions   Medication Sig     albuterol (PROAIR HFA;PROVENTIL HFA;VENTOLIN HFA) 90 mcg/actuation inhaler Inhale 2 puffs every 4 (four) hours as needed for wheezing.     albuterol (PROVENTIL) 2.5 mg /3 mL (0.083 %) nebulizer solution Take 2.5 mg by nebulization every 6 (six) hours as needed for wheezing.     bisacodyl (DULCOLAX, BISACODYL,) 5 mg EC tablet Take 5 mg by mouth daily as needed for constipation (patient takes 5 mg by mouth twice weekly on average).     budesonide-formoterol (SYMBICORT) 160-4.5 mcg/actuation inhaler Inhale 1 puff 2 (two) times a day.     predniSONE (DELTASONE) 10 mg tablet Take Prednisone 40 mg PO starting 9/14/2018 for 1 day then take 30 mg po daily for 3 days( 9/15-9/17)  then take 20 mg po daily for 3 days( 9/1809/20) then take 10 mg po daily for 3 days(9/21-9/23) then 5 mg po daily for 3 days( 9/24-9/26) then stop. "     umeclidinium (INCRUSE ELLIPTA) 62.5 mcg/actuation DsDv inhaler Inhale 1 puff daily.       Labs:  No results found for this or any previous visit (from the past 72 hour(s)).    I have personally reviewed all pertinent imaging studies and PFT results unless otherwise noted.    Imaging studies:  Xr Chest 2 Views    Result Date: 9/8/2018  XR CHEST 2 VIEWS 9/8/2018 1:15 AM INDICATION: Sob COMPARISON: 5/19/2018 FINDINGS: Scoliosis. Normal heart size. Lungs clear.        Physical Exam:  /58  Pulse 76  Resp 28  Wt 130 lb 11.2 oz (59.3 kg)  SpO2 93% Comment: 4 LPM  BMI 27.32 kg/m2  General - Well nourished  Ears/Mouth -  OP pink moist, no thrush  Neck - no cervical lymphadenopathy  Lungs - Very faint throughout with scattered rhonchi  CVS - regular rhythm with no murmurs, rubs or gallups  Abdomen - soft, NT, ND, NABS  Ext - no cyanosis, clubbing or edema  Skin - no rash  Psychology - alert and oriented, answers appropriate        Electronically signed by:    Shane Cobos MD PhD  [facility] Pulmonary and Critical Care Medicine

## 2021-06-21 NOTE — PROGRESS NOTES
Outpatient Palliative Care Consult      Soila Blackburn,  1955, MRN [id number]        PCP: Vern Pascal MD, [phone]   Code status:  DNR/I       Extended Emergency Contact Information  Primary Emergency Contact: Karyn Silver   [facility]  Home Phone: [phone]  Mobile Phone: [phone]  Relation: Sibling          Impression and Recommendations:  1. Shortness of breath related to end stage COPD/emphysema  Comment:  Recurrent hospitalizations for shortness of breath/COPD exacerbation; lives on 3rd floor of apartment building with no elevator; goes up and down stairs at least once per day  Plan:  Continue regimen of Incruse once a day  Continue Symbicort two times a day  Azithromycin 3 times per week was started to help minimize exacerbations  Oxygen continuous at 4 L/NC  Pace activities  Consider morphine for breathless at some point but does not seem to require or request it today    2. Generalized weakness  Comment:  Moves slowly; but is able to go up and down 3 flights of stairs in apartment; able to do ADLs and household activities if she takes it very slowly  Plan:  Wear oxygen continuously  Pace activities    3. Palliative Care Encounter - Please see discussion below.  Comment:  Confirms DNR/I; would not want mechanical ventilator or breathing tube or feeding tube  Plan:  Honoring Choices completed today  Will need ongoing support for explanations and decision making regarding disease and progression  Encourage to follow up with our clinic 3 months       Chief Complaint Goals of care discussion and shortness of breath       HPI    Soila Blackburn is a 63 y.o. year old female presents to the Outpatient Palliative Care Clinic today for ongoing symptom management and goals of care discussion.  NAVYA Goff was present as part of the interdisciplinary team.  Patient was brought to clinic today by her  but he was in waiting room during this appointment.    Referred by   "Shane Cobos, pulmonologist    Summary:  Soila is a 63 year old woman with very severe COPD/emphysema with chronic hypoxic, hypercarbic respiratory failure.  She has end stage lung disease and has had multiple exacerbations.  She does not like BIPAP.  She has very little reserve.  She was most recently hospitalized 9/7-13/18 with acute on chronic respiratory acidosis and placed on BIPAP and IV solumedrol.  She was slowly weaned to her baseline of 4 L oxygen continuously.  She is a DNR/I.  She has concerns for how she will be able to afford her medications.  We had long discussion today regarding disease trajectory, meaning of \"end stage\" disease, care goals and hopes for future.  Soila was able to complete Honoring Choices today with our assistance and that will be scanned into record.     June 2015   PULMONARY FUNCTION TEST  FEV1/FVC is 43% and is reduced.  FEV1 is 0.57 L (25%) predicted and is reduced.  FVC is 1.34 L (47%) predicted and reduced.  There was no improvement in spirometry after a single inhaled dose of bronchodilator.  TLC is 6.17 L (134%) predicted and is increased.  RV is 4.71 L (255%) predicted and is increased.  DLCO is 31% predicted and is reduced when it is corrected for hemoglobin.     Impression:    Full Pulmonary Function Test is abnormal.  PFTs are consistent with very severe obstructive disease.  Spirometry is not consistent with reversibility.  There is hyperinflation.  There is air-trapping.  Diffusion capacity when corrected for hemoglobin is severely reduced.     SIX MINUTE WALK TEST / HOME O2 EVALUATION     SpO2 at rest on RA 87%  SpO2 after walking 6 minutes on RA  83%  Distance covered 1000 feet.  Recovery phase, SpO2 after 1 minute rest on RA was 84%  Recovery phase, SpO2 after 2 minutes rest on RA was 87%     Impression:   Hypoxia at rest , significant desaturation with activities.   Start O2 supplementation 2 LPM continuously, repeat six minute walk test to titrate FiO2.     "       Previous hospitalizations:  9/7-13/18 shortness of breath  5/4-6/18  Shortness of breath    Palliative Care Assessment:  Living situation: lives with  and adult son in 3rd floor apartment; no elevator  Baseline functional status/Current Palliative Performance Score:  (0=death; 100=fully functional):    70%-  1. Reduced; 2. Unable to do job/normal work, significant disease; 3. Full; 4. Normal or reduced; 5. Full  PPS prior to decline/recent changes in condition:  Marital status/children:  , one son  Support systems: family  Financial concerns:  States she lives on social security disability and pension; concerned about hospital bills and costs of medications; reassured her of Medicare benefits  Employment:  Worked for 25 years in [employer] at [employer]    Serious Illness Conversation:  Patient's current understanding of illness:  She understands that her disease is not going to get better but it is difficult to talk about  Patient's/family's hopes/wishes/goals: to live as long as possible with as many good days as possible  Patient's/family's fears/worries about future with your health:  Worries about dying  What gives you strength/how do you cope: family  How much does your family know about your wishes:  States  knows what she would want    Advance Care Planning:  Code Status:  Long conversation today; confirmed decision for DNR/I; did not like the BIPAP in hospital  Health Care Directive:  Completed Honoring Choices document today; states would not want a breathing tube and would not want to be on a ventilator or have a feeding tube  Documented Healthcare Agent or surrogate;  Raghav  Decision making capacity:  Patient demonstrates decision-making capacity; she does demonstrate understanding of relevant information about condition, the available test and treatment options, use reason to make a decision about these, and communicate choice about these. (MORAIMA 306, 4, p. 420-4).   We did have to provide the information slowly and explain clearly.    Patient does meet criteria for Hospice Medicare benefit. Diagnosis end stage COPD, oxygen dependent with recurrent hospitalizations.  She is not ready to discuss this option yet.  Can follow up with palliative care for symptom management and further discussions.         Medical History  Active Ambulatory (Non-Hospital) Problems    Diagnosis     Lethargy     Respiratory acidosis     Acute intractable headache, unspecified headache type     COPD with exacerbation (H)     Advanced care planning/counseling discussion     Chronic respiratory failure (H)     COPD (chronic obstructive pulmonary disease) (H)     COPD exacerbation (H)     Acute-on-chronic respiratory failure (H)     Past Medical History:   Diagnosis Date     Advanced care planning/counseling discussion      Chronic respiratory failure (H)      COPD (chronic obstructive pulmonary disease) (H)      COPD exacerbation (H)     Surgical History  She  has a past surgical history that includes Cataract extraction.   Social History  Reviewed, and she  reports that she has quit smoking. She has never used smokeless tobacco. She reports that she does not drink alcohol or use illicit drugs.   Allergies  No Known Allergies Family History  Reviewed, and family history includes COPD in her father; Colon cancer in her mother; Dementia in her mother.   Psychosocial Needs  Social History     Social History Narrative    On 4L of home oxygen.   She is  and lives with her .  She has 1 son.  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.     Additional psychosocial needs reviewed per nursing assessment.     Medications & Allergies   Medications:     Current Outpatient Prescriptions:      albuterol (PROAIR HFA;PROVENTIL HFA;VENTOLIN HFA) 90 mcg/actuation inhaler, Inhale 2 puffs every 4 (four) hours as needed for wheezing., Disp: 1 Inhaler, Rfl: 6     albuterol (PROVENTIL) 2.5 mg /3  "mL (0.083 %) nebulizer solution, Take 2.5 mg by nebulization every 6 (six) hours as needed for wheezing., Disp: , Rfl: 11     azithromycin (ZITHROMAX) 250 MG tablet, Take 1 tablet (250 mg total) by mouth daily. Take 500 mg (2 x 250 mg tablets) on day 1 followed by 250 mg (1 tablet) on days 2-5., Disp: 30 tablet, Rfl: 6     bisacodyl (DULCOLAX, BISACODYL,) 5 mg EC tablet, Take 5 mg by mouth daily as needed for constipation (patient takes 5 mg by mouth twice weekly on average)., Disp: , Rfl:      budesonide-formoterol (SYMBICORT) 160-4.5 mcg/actuation inhaler, Inhale 1 puff 2 (two) times a day., Disp: 1 Inhaler, Rfl: 6     umeclidinium (INCRUSE ELLIPTA) 62.5 mcg/actuation DsDv inhaler, Inhale 1 puff daily., Disp: 30 each, Rfl: 11    Allergies/intolerances:    No Known Allergies         Review of Systems:  Pain: 0/10  Dyspnea: 4/10  Fatigue: 2/10  Nausea: 0/10  Anorexia: 0/10  Drowsiness: 0/10  Constipation: no  Agitation: 0  Anxiety: 0/10  Depression: 0/10    Dementia: 0   Delirium: 0  Coma: 0    Physical Exam:  /70  Pulse 75  Resp 10  Ht 4' 10\" (1.473 m)  Wt 131 lb (59.4 kg)  SpO2 (!) 86%  Breastfeeding? No  BMI 27.38 kg/m2  General appearance: alert, appears stated age, cooperative, no distress and mildly obese  Head: Normocephalic, without obvious abnormality, atraumatic  Nose: Nares normal. Septum midline. Mucosa normal. No drainage or sinus tenderness.  Lungs: very diminished with intermittent wheeze; oxygen on during conversation after sat 86% on room air after entering office  Heart: regular rate and rhythm, S1, S2 normal, no murmur, click, rub or gallop  Abdomen: soft, non-tender; bowel sounds normal; no masses,  no organomegaly  Extremities: extremities normal, atraumatic, no cyanosis or edema  Skin: Skin color, texture, turgor normal. No rashes or lesions  Neurologic: Grossly normal; able to ambulate independently       Pertinent Labs  Lab Results: personally reviewed.   Lab Results   Component " Value Date     (L) 09/08/2018     05/19/2018     05/05/2018    K 4.1 09/08/2018    K 5.0 05/19/2018    K 4.3 05/05/2018    CO2 30 09/08/2018    CO2 31 05/19/2018    CO2 32 (H) 05/05/2018    BUN 15 09/08/2018    BUN 12 05/19/2018    BUN 10 05/05/2018    CREATININE 0.68 09/08/2018    CREATININE 0.51 (L) 05/19/2018    CREATININE 0.55 (L) 05/05/2018    CALCIUM 9.6 09/08/2018    CALCIUM 9.3 05/19/2018    CALCIUM 9.0 05/05/2018     Lab Results   Component Value Date    WBC 8.8 09/09/2018    WBC 8.4 09/08/2018    WBC 15.2 (H) 05/19/2018    WBC 11.9 (H) 05/27/2015    WBC 3.7 (L) 05/26/2015    WBC 6.3 05/25/2015    HGB 14.6 09/09/2018    HGB 15.2 09/08/2018    HGB 14.4 05/19/2018    HCT 43.8 09/09/2018    HCT 43.8 09/08/2018    HCT 44.3 05/19/2018    MCV 95 09/09/2018    MCV 92 09/08/2018    MCV 98 05/19/2018     09/09/2018     09/08/2018     05/19/2018     AST   Date Value Ref Range Status   09/08/2018 15 0 - 40 U/L Final     ALT   Date Value Ref Range Status   09/08/2018 13 0 - 45 U/L Final     Alkaline Phosphatase   Date Value Ref Range Status   09/08/2018 66 45 - 120 U/L Final     Albumin   Date Value Ref Range Status   09/08/2018 3.9 3.5 - 5.0 g/dL Final      Pertinent Radiology  Radiology Results: Personally reviewed impression/s  EKG Results: not available   E/M time/ total time: 60 minutes    >50% of time during encounter was spent with family and patient on counseling and/or care coordination as documented above. Y    Advanced care planning 20 minutes discussing and completing Honoring Choices document, discussing goals and wishes regarding resuscitation.    Erika Butt, APRN/CNP  Palliative Care Services  [phone]

## 2021-06-24 NOTE — PATIENT INSTRUCTIONS - HE
Will re-order nebulizer for you.  Use oxygen to keep oxygen level 88-92%  Continue inhalers  Follow up with Dr. Cobos as scheduled

## 2021-06-24 NOTE — PROGRESS NOTES
DME Provider: Ashley  Date Faxed: 2/15/19  Ordering Provider: Dr. Aldrich  Equipment ordered: nebulizer and needed a face-to-face saying that she still needs oxygen

## 2021-06-24 NOTE — TELEPHONE ENCOUNTER
Spoke with Soila, she is doing well, everything is going better now with her new machine.  She is in her green zone this morning.  Olya Wooten, EVANT, COPD Educator

## 2021-07-03 NOTE — ADDENDUM NOTE
Addendum Note by Shane Cobos MD at 5/1/2018 12:17 PM     Author: Shane Cobos MD Service: -- Author Type: Physician    Filed: 5/1/2018 12:17 PM Encounter Date: 5/1/2018 Status: Signed    : Shane Cobos MD (Physician)    Addended by: SHANE COBOS on: 5/1/2018 12:17 PM        Modules accepted: Orders